# Patient Record
Sex: FEMALE | Race: WHITE | NOT HISPANIC OR LATINO | Employment: UNEMPLOYED | URBAN - METROPOLITAN AREA
[De-identification: names, ages, dates, MRNs, and addresses within clinical notes are randomized per-mention and may not be internally consistent; named-entity substitution may affect disease eponyms.]

---

## 2017-01-16 ENCOUNTER — OFFICE VISIT (OUTPATIENT)
Dept: URGENT CARE | Facility: CLINIC | Age: 47
End: 2017-01-16

## 2017-01-16 VITALS
BODY MASS INDEX: 23.47 KG/M2 | WEIGHT: 140.88 LBS | RESPIRATION RATE: 18 BRPM | OXYGEN SATURATION: 96 % | TEMPERATURE: 98 F | DIASTOLIC BLOOD PRESSURE: 70 MMHG | HEART RATE: 61 BPM | HEIGHT: 65 IN | SYSTOLIC BLOOD PRESSURE: 120 MMHG

## 2017-01-16 DIAGNOSIS — J32.9 SINUSITIS, UNSPECIFIED CHRONICITY, UNSPECIFIED LOCATION: Primary | ICD-10-CM

## 2017-01-16 PROCEDURE — 99213 OFFICE O/P EST LOW 20 MIN: CPT | Mod: ,,, | Performed by: NURSE PRACTITIONER

## 2017-01-16 PROCEDURE — 99999 PR PBB SHADOW E&M-EST. PATIENT-LVL III: CPT | Mod: PBBFAC,,, | Performed by: NURSE PRACTITIONER

## 2017-01-16 RX ORDER — AMOXICILLIN AND CLAVULANATE POTASSIUM 875; 125 MG/1; MG/1
1 TABLET, FILM COATED ORAL 2 TIMES DAILY
Qty: 20 TABLET | Refills: 0 | Status: SHIPPED | OUTPATIENT
Start: 2017-01-16 | End: 2017-01-26

## 2017-01-16 NOTE — PROGRESS NOTES
Subjective:       Patient ID: Alize Bourne is a 46 y.o. female.    Chief Complaint: Sinusitis    HPI Comments: Pt is a 46 year old female to clinic today with complaints of sinus congestion, PND, and rhinorrhea times 12 days.    Sinusitis   This is a new problem. The current episode started 1 to 4 weeks ago (12 days). The problem has been gradually worsening since onset. There has been no fever. She is experiencing no pain. Associated symptoms include congestion, coughing, headaches, sinus pressure, sneezing and a sore throat. Pertinent negatives include no chills, diaphoresis, ear pain, hoarse voice, neck pain or shortness of breath. Treatments tried: mucinex, flonase, allegra. The treatment provided mild relief.     Review of Systems   Constitutional: Negative for chills, diaphoresis, fatigue and fever.   HENT: Positive for congestion, postnasal drip, rhinorrhea, sinus pressure, sneezing and sore throat. Negative for ear discharge, ear pain and hoarse voice.    Eyes: Negative for pain.   Respiratory: Positive for cough. Negative for chest tightness, shortness of breath and wheezing.    Cardiovascular: Negative for chest pain and palpitations.   Gastrointestinal: Negative for abdominal pain, diarrhea, nausea and vomiting.   Genitourinary: Negative for dysuria.   Musculoskeletal: Negative for back pain, myalgias and neck pain.   Skin: Negative for rash.   Neurological: Positive for headaches. Negative for dizziness and light-headedness.       Objective:      Physical Exam   Constitutional: She is oriented to person, place, and time. She appears well-developed and well-nourished. No distress.   HENT:   Head: Normocephalic.   Right Ear: Tympanic membrane, external ear and ear canal normal. No tenderness. Tympanic membrane is not bulging.   Left Ear: Tympanic membrane, external ear and ear canal normal. No tenderness. Tympanic membrane is not bulging.   Nose: Mucosal edema and rhinorrhea present. Right sinus  exhibits maxillary sinus tenderness and frontal sinus tenderness. Left sinus exhibits maxillary sinus tenderness and frontal sinus tenderness.   Mouth/Throat: Uvula is midline and mucous membranes are normal. Posterior oropharyngeal erythema present. No oropharyngeal exudate or posterior oropharyngeal edema.   Eyes: Conjunctivae and EOM are normal. Pupils are equal, round, and reactive to light.   Neck: Normal range of motion. Neck supple.   Cardiovascular: Normal rate, regular rhythm, S1 normal, S2 normal and normal heart sounds.  Exam reveals no gallop and no friction rub.    No murmur heard.  Pulmonary/Chest: Effort normal and breath sounds normal. No accessory muscle usage. No apnea, no tachypnea and no bradypnea. No respiratory distress. She has no decreased breath sounds. She has no wheezes. She has no rhonchi. She has no rales.   Abdominal: Bowel sounds are normal.   Musculoskeletal: Normal range of motion.   Lymphadenopathy:        Head (right side): No submental, no submandibular and no tonsillar adenopathy present.        Head (left side): No submental, no submandibular and no tonsillar adenopathy present.     She has no cervical adenopathy.   Neurological: She is alert and oriented to person, place, and time.   Skin: Skin is warm and dry. No rash noted. She is not diaphoretic.   Psychiatric: She has a normal mood and affect. Her speech is normal and behavior is normal.   Nursing note and vitals reviewed.      Assessment:       1. Sinusitis, unspecified chronicity, unspecified location        Plan:   Sinusitis, unspecified chronicity, unspecified location  -     amoxicillin-clavulanate 875-125mg (AUGMENTIN) 875-125 mg per tablet; Take 1 tablet by mouth 2 (two) times daily.  Dispense: 20 tablet; Refill: 0      Recommend continue flonase and allegra.    · Rest and increase fluids.   · May apply warm compresses as needed.   · Saline nasal spray or saline irrigation (Neti pot) to loosen nasal  congestion.  · Flonase or Nasacort to reduce inflammation in the sinus cavities.  · Take antibiotics exactly as prescribed. Make sure to complete the entire course of antibiotics even if you start feeling better. This will prevent recurrence of your infection and bacterial resistance.   · Do not drive, drink alcohol, or take any other sedating medications or substances while taking cough syrup.   · Follow up with your primary care provider or with ENT if not improved within a few days or sooner for any new or worsening symptoms.   · Go to the ER for any fever that does not improve with Tylenol/Ibuprofen, neck stiffness, rash, severe headache, vision changes, shortness of breath, chest pain, severe facial pain or swelling, or for any other new and concerning symptoms.

## 2017-01-16 NOTE — MR AVS SNAPSHOT
[facility]  [address] [facility] [place] [address]  Phone: [phone]  Fax: [phone]                  Alize Bourne   2017 12:20 PM   Office Visit    Description:  Female : 1970   Provider:  Maged Marie NP   Department:  [facility]           Reason for Visit     Sinusitis           Diagnoses this Visit        Comments    Sinusitis, unspecified chronicity, unspecified location    -  Primary            To Do List           Goals (5 Years of Data)     None      Follow-Up and Disposition     Return if symptoms worsen or fail to improve.       These Medications        Disp Refills Start End    amoxicillin-clavulanate 875-125mg (AUGMENTIN) 875-125 mg per tablet 20 tablet 0 2017    Take 1 tablet by mouth 2 (two) times daily. - Oral    [facility] [address] [place] [address] [place] - [address] AT [facility] [address]  [address] Ph #: [phone]         [facility] On Call     [facility] On Call Nurse Care Line -  Assistance  Registered nurses in the [facility] provide clinical advisement, health education, appointment booking, and other advisory services.  Call for this free service at [phone].             Medications           Message regarding Medications     Verify the changes and/or additions to your medication regime listed below are the same as discussed with your clinician today.  If any of these changes or additions are incorrect, please notify your healthcare provider.        START taking these NEW medications        Refills    amoxicillin-clavulanate 875-125mg (AUGMENTIN) 875-125 mg per tablet 0    Sig: Take 1 tablet by mouth 2 (two) times daily.    Class: Normal    Route: Oral           Verify that the below list of medications is an accurate representation of the medications you are currently taking.  If none reported, the list may be blank. If incorrect, please contact your healthcare provider. Carry this list  "with you in case of emergency.           Current Medications     warfarin (COUMADIN) 10 MG tablet Take 10 mg by mouth every Mon, Wed, Fri.    warfarin (COUMADIN) 7.5 MG tablet Take 7.5 mg by mouth every Tues, Thurs, Sat.    amoxicillin-clavulanate 875-125mg (AUGMENTIN) 875-125 mg per tablet Take 1 tablet by mouth 2 (two) times daily.           Clinical Reference Information           Vital Signs - Last Recorded  Most recent update: 1/16/2017  1:06 PM by Pascale Azevedo LPN    BP Pulse Temp Resp    120/70 (BP Location: Right arm, Patient Position: Sitting, BP Method: Manual) 61 98.2 °F (36.8 °C) (Tympanic) 18    Ht Wt SpO2 BMI    5' 5" (1.651 m) 63.9 kg (140 lb 14 oz) 96% 23.44 kg/m2      Blood Pressure          Most Recent Value    BP  120/70      Allergies as of 1/16/2017     No Known Allergies      Immunizations Administered on Date of Encounter - 1/16/2017     None      [facility] Sign-Up     Activating your [facility] account is as easy as 1-2-3!     1) Visit [email or web address], select Sign Up Now, enter this activation code and your date of birth, then select Next.  [email or web address]  Expires: 3/2/2017  1:16 PM      2) Create a username and password to use when you visit [facility] in the future and select a security question in case you lose your password and select Next.    3) Enter your e-mail address and click Sign Up!    Additional Information  If you have questions, please e-mail [email or web address] or call [phone] to talk to our [facility] staff. Remember, [facility] is NOT to be used for urgent needs. For medical emergencies, dial 911.         Instructions      Sinusitis (Antibiotic Treatment)    The sinuses are air-filled spaces within the bones of the face. They connect to the inside of the nose. Sinusitis is an inflammation of the tissue lining the sinus cavity. Sinus inflammation can occur during a cold. It can also be due to allergies to pollens and other particles in the air. Sinusitis can cause " symptoms of sinus congestion and fullness. A sinus infection causes fever, headache and facial pain. There is often green or yellow drainage from the nose or into the back of the throat (post-nasal drip). You have been given antibiotics to treat this condition.  Home care:  · Take the full course of antibiotics as instructed. Do not stop taking them, even if you feel better.  · Drink plenty of water, hot tea, and other liquids. This may help thin mucus. It also may promote sinus drainage.  · Heat may help soothe painful areas of the face. Use a towel soaked in hot water. Or,  the shower and direct the hot spray onto your face. Using a vaporizer along with a menthol rub at night may also help.   · An expectorant containing guaifenesin may help thin the mucus and promote drainage from the sinuses.  · Over-the-counter decongestants may be used unless a similar medicine was prescribed. Nasal sprays work the fastest. Use one that contains phenylephrine or oxymetazoline. First blow the nose gently. Then use the spray. Do not use these medicines more often than directed on the label or symptoms may get worse. You may also use tablets containing pseudoephedrine. Avoid products that combine ingredients, because side effects may be increased. Read labels. You can also ask the pharmacist for help. (NOTE: Persons with high blood pressure should not use decongestants. They can raise blood pressure.)  · Over-the-counter antihistamines may help if allergies contributed to your sinusitis.    · Do not use nasal rinses or irrigation during an acute sinus infection, unless told to by your health care provider. Rinsing may spread the infection to other sinuses.  · Use acetaminophen or ibuprofen to control pain, unless another pain medicine was prescribed. (If you have chronic liver or kidney disease or ever had a stomach ulcer, talk with your doctor before using these medicines. Aspirin should never be used in anyone under 18  years of age who is ill with a fever. It may cause severe liver damage.)  · Don't smoke. This can worsen symptoms.  Follow-up care  Follow up with your healthcare provider or our staff if you are not improving within the next week.  When to seek medical advice  Call your healthcare provider if any of these occur:  · Facial pain or headache becoming more severe  · Stiff neck  · Unusual drowsiness or confusion  · Swelling of the forehead or eyelids  · Vision problems, including blurred or double vision  · Fever of 100.4ºF (38ºC) or higher, or as directed by your healthcare provider  · Seizure  · Breathing problems  · Symptoms not resolving within 10 days  © 3332-8420 [employer]. [address] [place] [address], [place] [address]. All rights reserved. This information is not intended as a substitute for professional medical care. Always follow your healthcare professional's instructions.

## 2017-10-23 ENCOUNTER — LAB VISIT (OUTPATIENT)
Dept: LAB | Facility: HOSPITAL | Age: 47
End: 2017-10-23
Attending: INTERNAL MEDICINE
Payer: COMMERCIAL

## 2017-10-23 ENCOUNTER — OFFICE VISIT (OUTPATIENT)
Dept: HEMATOLOGY/ONCOLOGY | Facility: CLINIC | Age: 47
End: 2017-10-23
Payer: COMMERCIAL

## 2017-10-23 VITALS
SYSTOLIC BLOOD PRESSURE: 128 MMHG | WEIGHT: 136.44 LBS | HEART RATE: 55 BPM | DIASTOLIC BLOOD PRESSURE: 82 MMHG | RESPIRATION RATE: 18 BRPM | OXYGEN SATURATION: 99 % | HEIGHT: 66 IN | BODY MASS INDEX: 21.93 KG/M2 | TEMPERATURE: 98 F

## 2017-10-23 DIAGNOSIS — I82.493 ACUTE DEEP VEIN THROMBOSIS (DVT) OF OTHER SPECIFIED VEIN OF BOTH LOWER EXTREMITIES: ICD-10-CM

## 2017-10-23 DIAGNOSIS — D68.51 FACTOR V LEIDEN MUTATION: ICD-10-CM

## 2017-10-23 DIAGNOSIS — D68.51 FACTOR V LEIDEN MUTATION: Primary | ICD-10-CM

## 2017-10-23 LAB
25(OH)D3+25(OH)D2 SERPL-MCNC: 47 NG/ML
ALBUMIN SERPL BCP-MCNC: 3.9 G/DL
ALP SERPL-CCNC: 35 U/L
ALT SERPL W/O P-5'-P-CCNC: 21 U/L
ANION GAP SERPL CALC-SCNC: 6 MMOL/L
AST SERPL-CCNC: 20 U/L
BASOPHILS # BLD AUTO: 0.02 K/UL
BASOPHILS NFR BLD: 0.4 %
BILIRUB SERPL-MCNC: 0.4 MG/DL
BUN SERPL-MCNC: 18 MG/DL
CALCIUM SERPL-MCNC: 8.7 MG/DL
CHLORIDE SERPL-SCNC: 104 MMOL/L
CO2 SERPL-SCNC: 26 MMOL/L
CREAT SERPL-MCNC: 0.7 MG/DL
DIFFERENTIAL METHOD: ABNORMAL
EOSINOPHIL # BLD AUTO: 0.1 K/UL
EOSINOPHIL NFR BLD: 2.4 %
ERYTHROCYTE [DISTWIDTH] IN BLOOD BY AUTOMATED COUNT: 12.8 %
EST. GFR  (AFRICAN AMERICAN): >60 ML/MIN/1.73 M^2
EST. GFR  (NON AFRICAN AMERICAN): >60 ML/MIN/1.73 M^2
GLUCOSE SERPL-MCNC: 102 MG/DL
HCT VFR BLD AUTO: 40 %
HCYS SERPL-SCNC: 5.7 UMOL/L
HGB BLD-MCNC: 13.9 G/DL
LDH SERPL L TO P-CCNC: 136 U/L
LYMPHOCYTES # BLD AUTO: 1.4 K/UL
LYMPHOCYTES NFR BLD: 26.5 %
MCH RBC QN AUTO: 35.1 PG
MCHC RBC AUTO-ENTMCNC: 34.8 G/DL
MCV RBC AUTO: 101 FL
MONOCYTES # BLD AUTO: 0.4 K/UL
MONOCYTES NFR BLD: 6.7 %
NEUTROPHILS # BLD AUTO: 3.5 K/UL
NEUTROPHILS NFR BLD: 64 %
PLATELET # BLD AUTO: 196 K/UL
PMV BLD AUTO: 11.1 FL
POTASSIUM SERPL-SCNC: 4.1 MMOL/L
PROT SERPL-MCNC: 6.4 G/DL
RBC # BLD AUTO: 3.96 M/UL
SODIUM SERPL-SCNC: 136 MMOL/L
WBC # BLD AUTO: 5.39 K/UL

## 2017-10-23 PROCEDURE — 83090 ASSAY OF HOMOCYSTEINE: CPT

## 2017-10-23 PROCEDURE — 99205 OFFICE O/P NEW HI 60 MIN: CPT | Mod: S$GLB,,, | Performed by: INTERNAL MEDICINE

## 2017-10-23 PROCEDURE — 82306 VITAMIN D 25 HYDROXY: CPT

## 2017-10-23 PROCEDURE — 80053 COMPREHEN METABOLIC PANEL: CPT | Mod: PO

## 2017-10-23 PROCEDURE — 36415 COLL VENOUS BLD VENIPUNCTURE: CPT | Mod: PO

## 2017-10-23 PROCEDURE — 83615 LACTATE (LD) (LDH) ENZYME: CPT | Mod: PO

## 2017-10-23 PROCEDURE — 85025 COMPLETE CBC W/AUTO DIFF WBC: CPT | Mod: PO

## 2017-10-23 PROCEDURE — 99999 PR PBB SHADOW E&M-EST. PATIENT-LVL III: CPT | Mod: PBBFAC,,, | Performed by: INTERNAL MEDICINE

## 2017-10-23 RX ORDER — WARFARIN SODIUM 5 MG/1
10 TABLET ORAL DAILY
Qty: 60 TABLET | Refills: 11 | Status: SHIPPED | OUTPATIENT
Start: 2017-10-23 | End: 2018-04-17 | Stop reason: SDUPTHER

## 2017-10-23 RX ORDER — AMOXICILLIN 500 MG
2 CAPSULE ORAL DAILY
COMMUNITY

## 2017-10-23 RX ORDER — CHOLECALCIFEROL (VITAMIN D3) 25 MCG
1000 TABLET ORAL DAILY
COMMUNITY

## 2017-10-23 RX ORDER — CALCIUM CARBONATE 600 MG
600 TABLET ORAL ONCE
COMMUNITY
End: 2018-11-01

## 2017-10-23 NOTE — PROGRESS NOTES
Hematology/Oncology Office Note    Reason for referral:  Recurrent DVTs  Heterozygosity for factor V Leiden mutation    CC:  Recurrent DVTs    Referred by:  Self, Aaareferral    Diagnosis:  Heterozygosity for factor V Leiden mutation  Recurrent lower limb DVTs    History of present illness:  47-year-old female with a history of heterozygosity for factor V Leiden mutation and recurrent lower extremity DVTs.  The patient carries a very strong family history of thrombosis and experience first DVT in 2005.  She was anticoagulated for approximately 2 years.  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  She was placed on indefinite anticoagulation with Coumadin at that time.  She remains on Coumadin with target INR 2-3 and manages Coumadin very well at home with home Coumadin monitor      Past Medical History:   Diagnosis Date    Deep vein thrombosis     H/O blood clots          Social History:  No tobacco, alcohol, or illicit drugs      Family History: family history is not on file.      HPI  Review of Systems   Constitutional: Negative for activity change, appetite change, fatigue, fever and unexpected weight change.   HENT: Negative for congestion, facial swelling, mouth sores, nosebleeds, rhinorrhea, sinus pain, sinus pressure, sore throat, trouble swallowing and voice change.    Eyes: Negative for pain, discharge, itching and visual disturbance.   Respiratory: Negative for apnea, cough, choking, chest tightness and shortness of breath.    Cardiovascular: Positive for leg swelling. Negative for chest pain and palpitations.   Gastrointestinal: Negative for abdominal distention, abdominal pain, diarrhea, nausea and vomiting.   Endocrine: Negative for cold intolerance and polyphagia.   Genitourinary: Negative for difficulty urinating, dyspareunia, dysuria, flank pain, frequency, hematuria, pelvic pain and vaginal bleeding.   Musculoskeletal: Negative for arthralgias, joint swelling,  "myalgias and neck pain.   Skin: Negative for pallor, rash and wound.   Allergic/Immunologic: Negative for environmental allergies and immunocompromised state.   Neurological: Negative for dizziness, seizures, syncope, speech difficulty, weakness, light-headedness and numbness.   Hematological: Negative for adenopathy. Does not bruise/bleed easily.   Psychiatric/Behavioral: Negative for agitation, behavioral problems, confusion and dysphoric mood. The patient is not nervous/anxious.        Objective:       Vitals:    10/23/17 0958   BP: 128/82   Pulse: (!) 55   Resp: 18   Temp: 97.5 °F (36.4 °C)   TempSrc: Oral   SpO2: 99%   Weight: 61.9 kg (136 lb 7.4 oz)   Height: 5' 5.5" (1.664 m)     Physical Exam   Constitutional: She is oriented to person, place, and time. She appears well-developed and well-nourished. No distress.   HENT:   Head: Normocephalic and atraumatic.   Nose: Nose normal.   Mouth/Throat: Oropharynx is clear and moist. No oropharyngeal exudate.   Eyes: Conjunctivae and EOM are normal. Pupils are equal, round, and reactive to light. No scleral icterus.   Neck: Normal range of motion. Neck supple. No tracheal deviation present. No thyromegaly present.   Cardiovascular: Normal rate, regular rhythm, normal heart sounds and intact distal pulses.  Exam reveals no gallop and no friction rub.    No murmur heard.  Pulmonary/Chest: Effort normal and breath sounds normal. She has no wheezes. She has no rales. She exhibits no tenderness.   Abdominal: Soft. Bowel sounds are normal. She exhibits no distension and no mass. There is no tenderness. There is no rebound and no guarding.   Musculoskeletal: Normal range of motion. She exhibits no edema or tenderness.   Lymphadenopathy:     She has no cervical adenopathy.   Neurological: She is alert and oriented to person, place, and time. No cranial nerve deficit. She exhibits normal muscle tone. Coordination normal.   Skin: Skin is warm and dry. Capillary refill takes " less than 2 seconds. No rash noted. She is not diaphoretic. No erythema. No pallor.   Psychiatric: She has a normal mood and affect. Her behavior is normal. Judgment and thought content normal.       Lab Results   Component Value Date    WBC 5.39 10/23/2017    HGB 13.9 10/23/2017    HCT 40.0 10/23/2017     (H) 10/23/2017     10/23/2017     No results found for: CREATININE, BUN, NA, K, CL, CO2  No results found for: ALT, AST, GGT, ALKPHOS, BILITOT      Assessment:       48 yo female with heterozygosity FVL with recurrent lower extremity DVTs currently on long-term anticoagulation with Coumadin.  Her family also has a very strong history of thrombotic events which suggest the presence of another thrombophilia risk factor.  She has managed Coumadin exceptionally well and monitors the INR at home.  We will continue Coumadin with goal INR 2-3.  We will repeat labs today which will include vitamin D level.  Plan to follow-up in 6 months with repeat labs.    Recurrent lower extremity DVTs with heterozygosity for factor V Leiden: Post-thrombotic syndrome  --Continue Coumadin with goal INR 2-3  --CBC, CMP      Vitamin D deficiency:  Currently on vitamin D/calcium supplementation  --Repeat calcium/vitamin D today

## 2018-02-08 ENCOUNTER — TELEPHONE (OUTPATIENT)
Dept: HEMATOLOGY/ONCOLOGY | Facility: CLINIC | Age: 48
End: 2018-02-08

## 2018-03-21 ENCOUNTER — TELEPHONE (OUTPATIENT)
Dept: HEMATOLOGY/ONCOLOGY | Facility: CLINIC | Age: 48
End: 2018-03-21

## 2018-03-21 NOTE — TELEPHONE ENCOUNTER
----- Message from Emilee Judge sent at 3/21/2018 11:22 AM CDT -----  Patient called and stated she is supposed to be seen in April. But she needs the orders for labs mailed to her because the doctor wants her to have her labs done before 1 week before she comes in. She never received the orders. She can be contacted at [phone].    Thanks,  Emilee

## 2018-04-17 ENCOUNTER — TELEPHONE (OUTPATIENT)
Dept: HEMATOLOGY/ONCOLOGY | Facility: CLINIC | Age: 48
End: 2018-04-17

## 2018-04-17 ENCOUNTER — OFFICE VISIT (OUTPATIENT)
Dept: HEMATOLOGY/ONCOLOGY | Facility: CLINIC | Age: 48
End: 2018-04-17
Payer: COMMERCIAL

## 2018-04-17 VITALS
RESPIRATION RATE: 18 BRPM | TEMPERATURE: 99 F | WEIGHT: 140.63 LBS | SYSTOLIC BLOOD PRESSURE: 108 MMHG | HEART RATE: 58 BPM | DIASTOLIC BLOOD PRESSURE: 74 MMHG | HEIGHT: 66 IN | OXYGEN SATURATION: 99 % | BODY MASS INDEX: 22.6 KG/M2

## 2018-04-17 DIAGNOSIS — D68.51 FACTOR V LEIDEN MUTATION: ICD-10-CM

## 2018-04-17 DIAGNOSIS — I82.493 ACUTE DEEP VEIN THROMBOSIS (DVT) OF OTHER SPECIFIED VEIN OF BOTH LOWER EXTREMITIES: ICD-10-CM

## 2018-04-17 PROCEDURE — 99999 PR PBB SHADOW E&M-EST. PATIENT-LVL III: CPT | Mod: PBBFAC,,, | Performed by: INTERNAL MEDICINE

## 2018-04-17 PROCEDURE — 99214 OFFICE O/P EST MOD 30 MIN: CPT | Mod: S$GLB,,, | Performed by: INTERNAL MEDICINE

## 2018-04-17 RX ORDER — WARFARIN SODIUM 5 MG/1
10 TABLET ORAL DAILY
Qty: 60 TABLET | Refills: 11 | Status: SHIPPED | OUTPATIENT
Start: 2018-04-17 | End: 2018-11-01 | Stop reason: SDUPTHER

## 2018-04-17 NOTE — TELEPHONE ENCOUNTER
----- Message from Bhavik Vigil Jr., MD sent at 4/17/2018  4:51 PM CDT -----  Contact: patient    Please call her and let her know that I [facility] Rx to [facility] [place].       ----- Message -----  From: Tete Ruth  Sent: 4/17/2018   3:26 PM  To: Bhavik Vigil Jr., MD    Forgot to request her refill for Warfarin 5 mg. Please call patient ASAP @ [phone]. Thanks, kristopher       [facility] [address] [place] [address] [address]  [facility] [address]   Phone: [phone] Fax: [phone]

## 2018-09-11 ENCOUNTER — PATIENT MESSAGE (OUTPATIENT)
Dept: HEMATOLOGY/ONCOLOGY | Facility: CLINIC | Age: 48
End: 2018-09-11

## 2018-10-23 ENCOUNTER — LAB VISIT (OUTPATIENT)
Dept: LAB | Facility: HOSPITAL | Age: 48
End: 2018-10-23
Attending: INTERNAL MEDICINE
Payer: COMMERCIAL

## 2018-10-23 DIAGNOSIS — D68.51 FACTOR V LEIDEN MUTATION: ICD-10-CM

## 2018-10-23 DIAGNOSIS — I82.493 ACUTE DEEP VEIN THROMBOSIS (DVT) OF OTHER SPECIFIED VEIN OF BOTH LOWER EXTREMITIES: ICD-10-CM

## 2018-10-23 LAB
25(OH)D3+25(OH)D2 SERPL-MCNC: 52 NG/ML
ALBUMIN SERPL BCP-MCNC: 4.2 G/DL
ALP SERPL-CCNC: 35 U/L
ALT SERPL W/O P-5'-P-CCNC: 14 U/L
ANION GAP SERPL CALC-SCNC: 12 MMOL/L
AST SERPL-CCNC: 20 U/L
BASOPHILS # BLD AUTO: 0.03 K/UL
BASOPHILS NFR BLD: 0.6 %
BILIRUB SERPL-MCNC: 0.3 MG/DL
BUN SERPL-MCNC: 15 MG/DL
CALCIUM SERPL-MCNC: 9.2 MG/DL
CHLORIDE SERPL-SCNC: 99 MMOL/L
CO2 SERPL-SCNC: 23 MMOL/L
CREAT SERPL-MCNC: 0.7 MG/DL
DIFFERENTIAL METHOD: ABNORMAL
EOSINOPHIL # BLD AUTO: 0.2 K/UL
EOSINOPHIL NFR BLD: 3 %
ERYTHROCYTE [DISTWIDTH] IN BLOOD BY AUTOMATED COUNT: 12.8 %
EST. GFR  (AFRICAN AMERICAN): >60 ML/MIN/1.73 M^2
EST. GFR  (NON AFRICAN AMERICAN): >60 ML/MIN/1.73 M^2
GLUCOSE SERPL-MCNC: 101 MG/DL
HCT VFR BLD AUTO: 42.8 %
HGB BLD-MCNC: 14.3 G/DL
LDH SERPL L TO P-CCNC: 117 U/L
LYMPHOCYTES # BLD AUTO: 1.7 K/UL
LYMPHOCYTES NFR BLD: 35.4 %
MCH RBC QN AUTO: 33.7 PG
MCHC RBC AUTO-ENTMCNC: 33.4 G/DL
MCV RBC AUTO: 101 FL
MONOCYTES # BLD AUTO: 0.3 K/UL
MONOCYTES NFR BLD: 6.7 %
NEUTROPHILS # BLD AUTO: 2.7 K/UL
NEUTROPHILS NFR BLD: 54.3 %
PLATELET # BLD AUTO: 248 K/UL
PMV BLD AUTO: 11 FL
POTASSIUM SERPL-SCNC: 4.1 MMOL/L
PROT SERPL-MCNC: 6.8 G/DL
RBC # BLD AUTO: 4.24 M/UL
SODIUM SERPL-SCNC: 134 MMOL/L
WBC # BLD AUTO: 4.92 K/UL

## 2018-10-23 PROCEDURE — 82306 VITAMIN D 25 HYDROXY: CPT

## 2018-10-23 PROCEDURE — 83615 LACTATE (LD) (LDH) ENZYME: CPT | Mod: PO

## 2018-10-23 PROCEDURE — 85025 COMPLETE CBC W/AUTO DIFF WBC: CPT | Mod: PO

## 2018-10-23 PROCEDURE — 36415 COLL VENOUS BLD VENIPUNCTURE: CPT | Mod: PO

## 2018-10-23 PROCEDURE — 80053 COMPREHEN METABOLIC PANEL: CPT | Mod: PO

## 2018-11-01 ENCOUNTER — OFFICE VISIT (OUTPATIENT)
Dept: HEMATOLOGY/ONCOLOGY | Facility: CLINIC | Age: 48
End: 2018-11-01
Payer: COMMERCIAL

## 2018-11-01 VITALS
OXYGEN SATURATION: 100 % | HEIGHT: 65 IN | TEMPERATURE: 97 F | SYSTOLIC BLOOD PRESSURE: 112 MMHG | HEART RATE: 55 BPM | RESPIRATION RATE: 18 BRPM | BODY MASS INDEX: 23.52 KG/M2 | WEIGHT: 141.13 LBS | DIASTOLIC BLOOD PRESSURE: 66 MMHG

## 2018-11-01 DIAGNOSIS — D68.51 FACTOR V LEIDEN MUTATION: Primary | ICD-10-CM

## 2018-11-01 DIAGNOSIS — I82.493 ACUTE DEEP VEIN THROMBOSIS (DVT) OF OTHER SPECIFIED VEIN OF BOTH LOWER EXTREMITIES: ICD-10-CM

## 2018-11-01 DIAGNOSIS — E55.9 VITAMIN D DEFICIENCY: ICD-10-CM

## 2018-11-01 PROCEDURE — 99999 PR PBB SHADOW E&M-EST. PATIENT-LVL III: CPT | Mod: PBBFAC,,, | Performed by: INTERNAL MEDICINE

## 2018-11-01 PROCEDURE — 99214 OFFICE O/P EST MOD 30 MIN: CPT | Mod: S$GLB,,, | Performed by: INTERNAL MEDICINE

## 2018-11-01 RX ORDER — WARFARIN SODIUM 5 MG/1
10 TABLET ORAL DAILY
Qty: 60 TABLET | Refills: 11 | Status: SHIPPED | OUTPATIENT
Start: 2018-11-01 | End: 2019-05-07 | Stop reason: SDUPTHER

## 2018-11-01 NOTE — PROGRESS NOTES
Hematology/Oncology Office Note    Reason for referral:  Recurrent DVTs  Heterozygosity for factor V Leiden mutation    CC:  Recurrent DVTs    Referred by:  No ref. provider found    Diagnosis:  Heterozygosity for factor V Leiden mutation  Recurrent lower limb DVTs    History of present illness:  47-year-old female with a history of heterozygosity for factor V Leiden mutation and recurrent lower extremity DVTs.  The patient carries a very strong family history of thrombosis and experience first DVT in 2005.  She was anticoagulated for approximately 2 years.  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  She was placed on indefinite anticoagulation with Coumadin at that time.  She remains on Coumadin with target INR 2-3 and manages Coumadin very well at home with home Coumadin monitor.    I have reviewed and updated the HPI, ROS, PMHx, Social Hx, Family Hx and treatment history.    Today's visit:  Patient is without complaints today.  She remains on Coumadin and denies any significant bleeding complications.    Past Medical History:   Diagnosis Date    Deep vein thrombosis     H/O blood clots          Social History:  No tobacco, alcohol, or illicit drugs      Family History: family history is not on file.      HPI    Review of Systems   Constitutional: Negative for activity change, appetite change, chills, diaphoresis and unexpected weight change.   HENT: Negative for congestion, dental problem, drooling, ear discharge, ear pain, facial swelling, mouth sores and nosebleeds.    Eyes: Negative.    Respiratory: Negative for apnea, cough, shortness of breath, wheezing and stridor.    Cardiovascular: Positive for leg swelling. Negative for chest pain and palpitations.   Gastrointestinal: Negative for abdominal distention, abdominal pain, anal bleeding, blood in stool and diarrhea.   Endocrine: Negative.    Genitourinary: Negative for difficulty urinating, dyspareunia, dysuria, enuresis,  "flank pain, frequency and hematuria.   Musculoskeletal: Negative for arthralgias, back pain, gait problem, joint swelling, myalgias and neck pain.   Skin: Negative for color change, pallor, rash and wound.   Allergic/Immunologic: Negative.    Neurological: Negative for dizziness, seizures, facial asymmetry, speech difficulty, light-headedness and headaches.   Hematological: Negative for adenopathy. Does not bruise/bleed easily.   Psychiatric/Behavioral: Negative for agitation, behavioral problems, confusion, decreased concentration, dysphoric mood and hallucinations. The patient is not nervous/anxious and is not hyperactive.        Objective:       Vitals:    11/01/18 1119   BP: 112/66   Pulse: (!) 55   Resp: 18   Temp: 97 °F (36.1 °C)   TempSrc: Oral   SpO2: 100%   Weight: 64 kg (141 lb 1.5 oz)   Height: 5' 5" (1.651 m)     Physical Exam   Constitutional: She is oriented to person, place, and time. She appears well-developed and well-nourished. No distress.   HENT:   Head: Normocephalic and atraumatic.   Nose: Nose normal.   Mouth/Throat: Oropharynx is clear and moist. No oropharyngeal exudate.   Eyes: Conjunctivae and EOM are normal. Pupils are equal, round, and reactive to light. No scleral icterus.   Neck: Normal range of motion. Neck supple. No tracheal deviation present. No thyromegaly present.   Cardiovascular: Normal rate, regular rhythm, normal heart sounds and intact distal pulses. Exam reveals no gallop and no friction rub.   No murmur heard.  Pulmonary/Chest: Effort normal and breath sounds normal. No accessory muscle usage. No respiratory distress. She has no decreased breath sounds. She has no wheezes. She has no rhonchi. She has no rales. She exhibits no tenderness.   Abdominal: Soft. Bowel sounds are normal. She exhibits no distension and no mass. There is no tenderness. There is no rebound and no guarding.   Musculoskeletal: Normal range of motion. She exhibits no edema or tenderness. "   Lymphadenopathy:        Head (right side): No submental and no submandibular adenopathy present.        Head (left side): No submental and no submandibular adenopathy present.     She has no cervical adenopathy.     She has no axillary adenopathy.        Right: No supraclavicular adenopathy present.        Left: No supraclavicular adenopathy present.   Neurological: She is alert and oriented to person, place, and time. No cranial nerve deficit. She exhibits normal muscle tone. Coordination normal.   Skin: Skin is warm and dry. Capillary refill takes less than 2 seconds. No abrasion, no bruising and no rash noted. She is not diaphoretic. No pallor.   Psychiatric: She has a normal mood and affect. Her behavior is normal. Judgment and thought content normal.       Lab Results   Component Value Date    WBC 4.92 10/23/2018    HGB 14.3 10/23/2018    HCT 42.8 10/23/2018     (H) 10/23/2018     10/23/2018     Lab Results   Component Value Date    CREATININE 0.7 10/23/2018    BUN 15 10/23/2018     (L) 10/23/2018    K 4.1 10/23/2018    CL 99 10/23/2018    CO2 23 10/23/2018     Lab Results   Component Value Date    ALT 14 10/23/2018    AST 20 10/23/2018    ALKPHOS 35 (L) 10/23/2018    BILITOT 0.3 10/23/2018         Assessment:       48 yo female with heterozygosity FVL with recurrent lower extremity DVTs currently on long-term anticoagulation with Coumadin.  Her family also has a very strong history of thrombotic events which suggest the presence of another thrombophilia risk factor.  She has managed Coumadin exceptionally well and monitors the INR at home.  We will continue Coumadin with goal INR 2-3.    Mild macrocytosis is present without associated anemia or other significant abnormalities within the CBC.  Workup has been unremarkable and CBC remains stable.  She will follow up in 6 months with repeat labs    Recurrent lower extremity DVTs with heterozygosity for factor V Leiden: Post-thrombotic  syndrome  --Continue Coumadin with goal INR 2-3  --CBC, CMP, INR checks at home with adjustment to Coumadin as necessary    Macrocytosis without anemia:  --repeat B12/folate at followup  --continue to monitor closely and proceed with bone marrow biopsy upon progression      Vitamin D deficiency:  Currently on vitamin D/calcium supplementation  --Repeat calcium/vitamin D today

## 2019-02-26 ENCOUNTER — PATIENT MESSAGE (OUTPATIENT)
Dept: HEMATOLOGY/ONCOLOGY | Facility: CLINIC | Age: 49
End: 2019-02-26

## 2019-05-07 ENCOUNTER — LAB VISIT (OUTPATIENT)
Dept: LAB | Facility: HOSPITAL | Age: 49
End: 2019-05-07
Payer: COMMERCIAL

## 2019-05-07 ENCOUNTER — OFFICE VISIT (OUTPATIENT)
Dept: HEMATOLOGY/ONCOLOGY | Facility: CLINIC | Age: 49
End: 2019-05-07
Payer: COMMERCIAL

## 2019-05-07 VITALS
WEIGHT: 143.75 LBS | BODY MASS INDEX: 23.95 KG/M2 | HEART RATE: 56 BPM | HEIGHT: 65 IN | OXYGEN SATURATION: 99 % | TEMPERATURE: 98 F | RESPIRATION RATE: 18 BRPM | DIASTOLIC BLOOD PRESSURE: 71 MMHG | SYSTOLIC BLOOD PRESSURE: 104 MMHG

## 2019-05-07 DIAGNOSIS — D68.51 FACTOR V LEIDEN MUTATION: Primary | ICD-10-CM

## 2019-05-07 DIAGNOSIS — E55.9 VITAMIN D DEFICIENCY: ICD-10-CM

## 2019-05-07 DIAGNOSIS — D68.51 FACTOR V LEIDEN MUTATION: ICD-10-CM

## 2019-05-07 DIAGNOSIS — I82.493 ACUTE DEEP VEIN THROMBOSIS (DVT) OF OTHER SPECIFIED VEIN OF BOTH LOWER EXTREMITIES: ICD-10-CM

## 2019-05-07 LAB
25(OH)D3+25(OH)D2 SERPL-MCNC: 45 NG/ML (ref 30–96)
ALBUMIN SERPL BCP-MCNC: 4.1 G/DL (ref 3.5–5.2)
ALP SERPL-CCNC: 38 U/L (ref 55–135)
ALT SERPL W/O P-5'-P-CCNC: 15 U/L (ref 10–44)
ANION GAP SERPL CALC-SCNC: 8 MMOL/L (ref 8–16)
AST SERPL-CCNC: 18 U/L (ref 10–40)
BASOPHILS # BLD AUTO: 0.03 K/UL (ref 0–0.2)
BASOPHILS NFR BLD: 0.5 % (ref 0–1.9)
BILIRUB SERPL-MCNC: 0.6 MG/DL (ref 0.1–1)
BUN SERPL-MCNC: 11 MG/DL (ref 6–20)
CALCIUM SERPL-MCNC: 9.2 MG/DL (ref 8.7–10.5)
CHLORIDE SERPL-SCNC: 101 MMOL/L (ref 95–110)
CO2 SERPL-SCNC: 25 MMOL/L (ref 23–29)
CREAT SERPL-MCNC: 0.7 MG/DL (ref 0.5–1.4)
DIFFERENTIAL METHOD: ABNORMAL
EOSINOPHIL # BLD AUTO: 0.1 K/UL (ref 0–0.5)
EOSINOPHIL NFR BLD: 1.5 % (ref 0–8)
ERYTHROCYTE [DISTWIDTH] IN BLOOD BY AUTOMATED COUNT: 12.5 % (ref 11.5–14.5)
EST. GFR  (AFRICAN AMERICAN): >60 ML/MIN/1.73 M^2
EST. GFR  (NON AFRICAN AMERICAN): >60 ML/MIN/1.73 M^2
FOLATE SERPL-MCNC: 12.9 NG/ML (ref 4–24)
GLUCOSE SERPL-MCNC: 89 MG/DL (ref 70–110)
HCT VFR BLD AUTO: 41.6 % (ref 37–48.5)
HGB BLD-MCNC: 14.4 G/DL (ref 12–16)
IMM GRANULOCYTES # BLD AUTO: 0.02 K/UL (ref 0–0.04)
IMM GRANULOCYTES NFR BLD AUTO: 0.3 % (ref 0–0.5)
IRON SERPL-MCNC: 128 UG/DL (ref 30–160)
LDH SERPL L TO P-CCNC: 134 U/L (ref 110–260)
LYMPHOCYTES # BLD AUTO: 1.4 K/UL (ref 1–4.8)
LYMPHOCYTES NFR BLD: 24.7 % (ref 18–48)
MCH RBC QN AUTO: 34.8 PG (ref 27–31)
MCHC RBC AUTO-ENTMCNC: 34.6 G/DL (ref 32–36)
MCV RBC AUTO: 101 FL (ref 82–98)
MONOCYTES # BLD AUTO: 0.5 K/UL (ref 0.3–1)
MONOCYTES NFR BLD: 8.4 % (ref 4–15)
NEUTROPHILS # BLD AUTO: 3.8 K/UL (ref 1.8–7.7)
NEUTROPHILS NFR BLD: 64.9 % (ref 38–73)
NRBC BLD-RTO: 0 /100 WBC
PLATELET # BLD AUTO: 249 K/UL (ref 150–350)
PMV BLD AUTO: 10.1 FL (ref 9.2–12.9)
POTASSIUM SERPL-SCNC: 3.6 MMOL/L (ref 3.5–5.1)
PROT SERPL-MCNC: 6.9 G/DL (ref 6–8.4)
RBC # BLD AUTO: 4.14 M/UL (ref 4–5.4)
SATURATED IRON: 35 % (ref 20–50)
SODIUM SERPL-SCNC: 134 MMOL/L (ref 136–145)
TOTAL IRON BINDING CAPACITY: 367 UG/DL (ref 250–450)
TRANSFERRIN SERPL-MCNC: 248 MG/DL (ref 200–375)
VIT B12 SERPL-MCNC: 1130 PG/ML (ref 210–950)
WBC # BLD AUTO: 5.84 K/UL (ref 3.9–12.7)

## 2019-05-07 PROCEDURE — 82607 VITAMIN B-12: CPT

## 2019-05-07 PROCEDURE — 99214 OFFICE O/P EST MOD 30 MIN: CPT | Mod: S$GLB,,, | Performed by: INTERNAL MEDICINE

## 2019-05-07 PROCEDURE — 36415 COLL VENOUS BLD VENIPUNCTURE: CPT

## 2019-05-07 PROCEDURE — 82306 VITAMIN D 25 HYDROXY: CPT

## 2019-05-07 PROCEDURE — 82746 ASSAY OF FOLIC ACID SERUM: CPT

## 2019-05-07 PROCEDURE — 83615 LACTATE (LD) (LDH) ENZYME: CPT

## 2019-05-07 PROCEDURE — 99999 PR PBB SHADOW E&M-EST. PATIENT-LVL III: ICD-10-PCS | Mod: PBBFAC,,, | Performed by: INTERNAL MEDICINE

## 2019-05-07 PROCEDURE — 85025 COMPLETE CBC W/AUTO DIFF WBC: CPT

## 2019-05-07 PROCEDURE — 99214 PR OFFICE/OUTPT VISIT, EST, LEVL IV, 30-39 MIN: ICD-10-PCS | Mod: S$GLB,,, | Performed by: INTERNAL MEDICINE

## 2019-05-07 PROCEDURE — 80053 COMPREHEN METABOLIC PANEL: CPT

## 2019-05-07 PROCEDURE — 99999 PR PBB SHADOW E&M-EST. PATIENT-LVL III: CPT | Mod: PBBFAC,,, | Performed by: INTERNAL MEDICINE

## 2019-05-07 PROCEDURE — 83540 ASSAY OF IRON: CPT

## 2019-05-07 RX ORDER — WARFARIN SODIUM 5 MG/1
10 TABLET ORAL DAILY
Qty: 60 TABLET | Refills: 11 | Status: SHIPPED | OUTPATIENT
Start: 2019-05-07 | End: 2020-05-06

## 2019-05-07 NOTE — PROGRESS NOTES
Hematology/Oncology Office Note    Reason for referral:  Recurrent DVTs  Heterozygosity for factor V Leiden mutation    CC:  Recurrent DVTs    Referred by:  No ref. provider found    Diagnosis:  Heterozygosity for factor V Leiden mutation  Recurrent lower limb DVTs    History of present illness:  47-year-old female with a history of heterozygosity for factor V Leiden mutation and recurrent lower extremity DVTs.  The patient carries a very strong family history of thrombosis and experience first DVT in 2005.  She was anticoagulated for approximately 2 years.  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  She was placed on indefinite anticoagulation with Coumadin at that time.  She remains on Coumadin with target INR 2-3 and manages Coumadin very well at home with home Coumadin monitor.    I have reviewed and updated the HPI, ROS, PMHx, Social Hx, Family Hx and treatment history.    Today's visit:  Patient is without complaints today.  She continues to be compliant with Coumadin home monitoring.    Past Medical History:   Diagnosis Date    Deep vein thrombosis     H/O blood clots          Social History:  No tobacco, alcohol, or illicit drugs      Family History: family history is not on file.      HPI    Review of Systems   Constitutional: Negative for activity change, appetite change, chills, diaphoresis and fatigue.   HENT: Negative for congestion, ear pain, facial swelling, hearing loss, mouth sores and nosebleeds.    Eyes: Negative.    Respiratory: Negative for apnea, chest tightness, shortness of breath and stridor.    Cardiovascular: Positive for leg swelling. Negative for chest pain and palpitations.   Gastrointestinal: Negative for abdominal distention, blood in stool, constipation, diarrhea, nausea and vomiting.   Endocrine: Negative.    Genitourinary: Negative for difficulty urinating, dyspareunia, enuresis, flank pain and hematuria.   Musculoskeletal: Negative for  "arthralgias, gait problem, joint swelling, myalgias and neck pain.   Skin: Negative for color change, pallor, rash and wound.   Allergic/Immunologic: Negative.    Neurological: Negative for dizziness, tremors, seizures, syncope, facial asymmetry, speech difficulty, light-headedness and numbness.   Hematological: Negative for adenopathy. Does not bruise/bleed easily.   Psychiatric/Behavioral: Negative for agitation, behavioral problems, confusion, decreased concentration, dysphoric mood and hallucinations. The patient is not nervous/anxious and is not hyperactive.        Objective:       Vitals:    05/07/19 1132   BP: 104/71   Pulse: (!) 56   Resp: 18   Temp: 98.1 °F (36.7 °C)   TempSrc: Oral   SpO2: 99%   Weight: 65.2 kg (143 lb 11.8 oz)   Height: 5' 5" (1.651 m)     Physical Exam   Constitutional: She is oriented to person, place, and time. She appears well-developed and well-nourished. No distress.   HENT:   Head: Normocephalic and atraumatic.   Nose: Nose normal.   Mouth/Throat: Oropharynx is clear and moist. No oropharyngeal exudate.   Eyes: Pupils are equal, round, and reactive to light. Conjunctivae and EOM are normal. No scleral icterus.   Neck: Normal range of motion. Neck supple. No tracheal deviation present. No thyromegaly present.   Cardiovascular: Normal rate, regular rhythm, normal heart sounds and intact distal pulses. Exam reveals no gallop and no friction rub.   No murmur heard.  Pulmonary/Chest: Effort normal and breath sounds normal. No stridor. No respiratory distress. She has no decreased breath sounds. She has no wheezes. She has no rhonchi. She has no rales. She exhibits no tenderness.   Abdominal: Soft. Bowel sounds are normal. She exhibits no distension and no mass. There is no tenderness. There is no rebound and no guarding.   Musculoskeletal: Normal range of motion. She exhibits no edema or tenderness.   Lymphadenopathy:        Head (right side): No submental and no submandibular " adenopathy present.        Head (left side): No submental and no submandibular adenopathy present.     She has no cervical adenopathy.     She has no axillary adenopathy.        Right: No supraclavicular adenopathy present.        Left: No supraclavicular adenopathy present.   Neurological: She is alert and oriented to person, place, and time. No cranial nerve deficit. She exhibits normal muscle tone. Coordination normal.   Skin: Skin is warm, dry and intact. Capillary refill takes less than 2 seconds. No abrasion, no bruising, no ecchymosis and no rash noted.   Psychiatric: She has a normal mood and affect. Her behavior is normal. Judgment and thought content normal.       Lab Results   Component Value Date    WBC 5.84 05/07/2019    HGB 14.4 05/07/2019    HCT 41.6 05/07/2019     (H) 05/07/2019     05/07/2019     Lab Results   Component Value Date    CREATININE 0.7 05/07/2019    BUN 11 05/07/2019     (L) 05/07/2019    K 3.6 05/07/2019     05/07/2019    CO2 25 05/07/2019     Lab Results   Component Value Date    ALT 15 05/07/2019    AST 18 05/07/2019    ALKPHOS 38 (L) 05/07/2019    BILITOT 0.6 05/07/2019         Assessment:       46 yo female with heterozygosity FVL with recurrent lower extremity DVTs currently on long-term anticoagulation with Coumadin.  Her family also has a very strong history of thrombotic events which suggest the presence of another thrombophilia risk factor.  She has managed Coumadin exceptionally well and monitors the INR at home.  We will continue Coumadin with goal INR 2-3.    Patient continues to have mild macrocytosis without anemia or other significant CBC abnormalities.  She will follow up with Dr. Felton in 6 months with repeat labs.     Recurrent lower extremity DVTs with heterozygosity for factor V Leiden: Post-thrombotic syndrome  --Continue Coumadin with goal INR 2-3  --CBC, CMP, INR checks at home with adjustment to Coumadin as necessary    Macrocytosis  without anemia:  --continue to monitor    Vitamin D deficiency:  Currently on vitamin D/calcium supplementation  --Repeat calcium/vitamin D today

## 2019-12-03 ENCOUNTER — PATIENT MESSAGE (OUTPATIENT)
Dept: HEMATOLOGY/ONCOLOGY | Facility: CLINIC | Age: 49
End: 2019-12-03

## 2019-12-03 ENCOUNTER — LAB VISIT (OUTPATIENT)
Dept: LAB | Facility: HOSPITAL | Age: 49
End: 2019-12-03
Attending: INTERNAL MEDICINE
Payer: COMMERCIAL

## 2019-12-03 ENCOUNTER — OFFICE VISIT (OUTPATIENT)
Dept: HEMATOLOGY/ONCOLOGY | Facility: CLINIC | Age: 49
End: 2019-12-03
Payer: COMMERCIAL

## 2019-12-03 VITALS
DIASTOLIC BLOOD PRESSURE: 74 MMHG | HEART RATE: 71 BPM | RESPIRATION RATE: 16 BRPM | HEIGHT: 65 IN | SYSTOLIC BLOOD PRESSURE: 115 MMHG | BODY MASS INDEX: 24.43 KG/M2 | TEMPERATURE: 99 F | WEIGHT: 146.63 LBS

## 2019-12-03 DIAGNOSIS — D68.51 FACTOR 5 LEIDEN MUTATION, HETEROZYGOUS: Primary | ICD-10-CM

## 2019-12-03 DIAGNOSIS — Z15.89 MTHFR MUTATION: ICD-10-CM

## 2019-12-03 DIAGNOSIS — D68.51 FACTOR V LEIDEN MUTATION: ICD-10-CM

## 2019-12-03 DIAGNOSIS — Z79.01 CURRENT USE OF LONG TERM ANTICOAGULATION: ICD-10-CM

## 2019-12-03 DIAGNOSIS — I82.493 ACUTE DEEP VEIN THROMBOSIS (DVT) OF OTHER SPECIFIED VEIN OF BOTH LOWER EXTREMITIES: ICD-10-CM

## 2019-12-03 DIAGNOSIS — Z86.718 HISTORY OF DEEP VENOUS THROMBOSIS (DVT) OF DISTAL VEIN OF RIGHT LOWER EXTREMITY: ICD-10-CM

## 2019-12-03 LAB
ALBUMIN SERPL BCP-MCNC: 4 G/DL (ref 3.5–5.2)
ALP SERPL-CCNC: 38 U/L (ref 55–135)
ALT SERPL W/O P-5'-P-CCNC: 22 U/L (ref 10–44)
ANION GAP SERPL CALC-SCNC: 8 MMOL/L (ref 8–16)
AST SERPL-CCNC: 26 U/L (ref 10–40)
BASOPHILS # BLD AUTO: 0.04 K/UL (ref 0–0.2)
BASOPHILS NFR BLD: 0.8 % (ref 0–1.9)
BILIRUB SERPL-MCNC: 0.4 MG/DL (ref 0.1–1)
BUN SERPL-MCNC: 15 MG/DL (ref 6–20)
CALCIUM SERPL-MCNC: 8.7 MG/DL (ref 8.7–10.5)
CHLORIDE SERPL-SCNC: 103 MMOL/L (ref 95–110)
CO2 SERPL-SCNC: 27 MMOL/L (ref 23–29)
CREAT SERPL-MCNC: 0.7 MG/DL (ref 0.5–1.4)
DIFFERENTIAL METHOD: ABNORMAL
EOSINOPHIL # BLD AUTO: 0.2 K/UL (ref 0–0.5)
EOSINOPHIL NFR BLD: 3.1 % (ref 0–8)
ERYTHROCYTE [DISTWIDTH] IN BLOOD BY AUTOMATED COUNT: 12.6 % (ref 11.5–14.5)
EST. GFR  (AFRICAN AMERICAN): >60 ML/MIN/1.73 M^2
EST. GFR  (NON AFRICAN AMERICAN): >60 ML/MIN/1.73 M^2
GLUCOSE SERPL-MCNC: 94 MG/DL (ref 70–110)
HCT VFR BLD AUTO: 42.2 % (ref 37–48.5)
HGB BLD-MCNC: 14.1 G/DL (ref 12–16)
IMM GRANULOCYTES # BLD AUTO: 0.01 K/UL (ref 0–0.04)
IMM GRANULOCYTES NFR BLD AUTO: 0.2 % (ref 0–0.5)
LYMPHOCYTES # BLD AUTO: 1.6 K/UL (ref 1–4.8)
LYMPHOCYTES NFR BLD: 31.5 % (ref 18–48)
MCH RBC QN AUTO: 34 PG (ref 27–31)
MCHC RBC AUTO-ENTMCNC: 33.4 G/DL (ref 32–36)
MCV RBC AUTO: 102 FL (ref 82–98)
MONOCYTES # BLD AUTO: 0.5 K/UL (ref 0.3–1)
MONOCYTES NFR BLD: 10.2 % (ref 4–15)
NEUTROPHILS # BLD AUTO: 2.8 K/UL (ref 1.8–7.7)
NEUTROPHILS NFR BLD: 54.4 % (ref 38–73)
NRBC BLD-RTO: 0 /100 WBC
PLATELET # BLD AUTO: 219 K/UL (ref 150–350)
PMV BLD AUTO: 10.1 FL (ref 9.2–12.9)
POTASSIUM SERPL-SCNC: 3.9 MMOL/L (ref 3.5–5.1)
PROT SERPL-MCNC: 6.5 G/DL (ref 6–8.4)
RBC # BLD AUTO: 4.15 M/UL (ref 4–5.4)
SODIUM SERPL-SCNC: 138 MMOL/L (ref 136–145)
WBC # BLD AUTO: 5.21 K/UL (ref 3.9–12.7)

## 2019-12-03 PROCEDURE — 99999 PR PBB SHADOW E&M-EST. PATIENT-LVL III: ICD-10-PCS | Mod: PBBFAC,,, | Performed by: INTERNAL MEDICINE

## 2019-12-03 PROCEDURE — 85025 COMPLETE CBC W/AUTO DIFF WBC: CPT

## 2019-12-03 PROCEDURE — 99999 PR PBB SHADOW E&M-EST. PATIENT-LVL III: CPT | Mod: PBBFAC,,, | Performed by: INTERNAL MEDICINE

## 2019-12-03 PROCEDURE — 99214 PR OFFICE/OUTPT VISIT, EST, LEVL IV, 30-39 MIN: ICD-10-PCS | Mod: S$GLB,,, | Performed by: INTERNAL MEDICINE

## 2019-12-03 PROCEDURE — 99214 OFFICE O/P EST MOD 30 MIN: CPT | Mod: S$GLB,,, | Performed by: INTERNAL MEDICINE

## 2019-12-03 PROCEDURE — 80053 COMPREHEN METABOLIC PANEL: CPT

## 2019-12-03 PROCEDURE — 36415 COLL VENOUS BLD VENIPUNCTURE: CPT

## 2019-12-03 RX ORDER — WARFARIN 2.5 MG/1
2.5 TABLET ORAL ONCE
Qty: 30 TABLET | Refills: 11 | Status: SHIPPED | OUTPATIENT
Start: 2019-12-03 | End: 2019-12-03

## 2019-12-03 NOTE — PROGRESS NOTES
Subjective:      DATE OF VISIT: 12/3/2019   ?   ?   Patient ID:?Alize Bourne is a 49 y.o. female.?? MR#: [id number]   ?   PRIMARY PROVIDER: Dr. Vigil  ?   CHIEF COMPLAINT:  Follow-up?????   ?   ONCOLOGIC DIAGNOSIS:  Factor 5 Leiden mutation heterozygote  ?   CURRENT TREATMENT:  Coumadin anticoagulation, INR goal 2-3      HPI    I had the pleasure meeting Ms. Bourne for the 1st time she transferred her care.  She was last seen by my colleague Dr. Vigil in May 2019.  I have reviewed her medical history which is notable for initial right lower extremity DVT in  in the setting of mild trauma and OCP use.  She notes having vascular/valvular damage from this event.  She discontinued on OCP and put on Coumadin for about a year.  In  following trip to [facility] after long plane flight developed left lower extremity edema and was later found to have superficial thrombosis for which she was again started on Coumadin.  She does note family history of brother who  from thrombotic event in .  Since her and family members have been tested and multiple family members with factor 5 Leiden; she was found to be heterozygote.  Do not have thrombophilia lab work and have requested she provide this at her convenience.  She also notes MTHFR mutation.  She has not had subsequent thrombotic event.  She continues on Coumadin with INR 2-3 and has remained within normal range with home checking and dietary regulation. DOAC option her anticoagulation has been discussed in the past although she feels most comfortable with Coumadin given availability of easy reversal agent and frequent travel to areas that may not have access to reversal if concerning bleeding complication.  She has had thus far no issues with bleeding or bruising.      Review of Systems    ?   A comprehensive 14-point review of systems was reviewed with patient and was negative other than as specified above.   ?     Objective:      Physical Exam      ?    Vitals:    12/03/19 1030   BP: 115/74   Pulse: 71   Resp: 16   Temp: 98.5 °F (36.9 °C)      ?   ECOG:?0   General appearance: Generally well appearing, in no acute distress.   Head, eyes, ears, nose, and throat: Oropharynx clear with moist mucous membranes.   Cardiovascular: Regular rate and rhythm, S1, S2, no audible murmurs.   Respiratory: Lungs clear to auscultation bilaterally.   Abdomen: nontender, nondistended.   Extremities: Warm, without edema.   Neurologic: Alert and oriented. Grossly normal strength, coordination, and gait.   Skin: No rashes, ecchymoses or petechial lesion.   Psychiatric: normal mood and affect, conversant and appropriate    ?   Laboratory:  ?   Lab Visit on 12/03/2019   Component Date Value Ref Range Status    WBC 12/03/2019 5.21  3.90 - 12.70 K/uL Final    RBC 12/03/2019 4.15  4.00 - 5.40 M/uL Final    Hemoglobin 12/03/2019 14.1  12.0 - 16.0 g/dL Final    Hematocrit 12/03/2019 42.2  37.0 - 48.5 % Final    Mean Corpuscular Volume 12/03/2019 102* 82 - 98 fL Final    Mean Corpuscular Hemoglobin 12/03/2019 34.0* 27.0 - 31.0 pg Final    Mean Corpuscular Hemoglobin Conc 12/03/2019 33.4  32.0 - 36.0 g/dL Final    RDW 12/03/2019 12.6  11.5 - 14.5 % Final    Platelets 12/03/2019 219  150 - 350 K/uL Final    MPV 12/03/2019 10.1  9.2 - 12.9 fL Final    Immature Granulocytes 12/03/2019 0.2  0.0 - 0.5 % Final    Gran # (ANC) 12/03/2019 2.8  1.8 - 7.7 K/uL Final    Immature Grans (Abs) 12/03/2019 0.01  0.00 - 0.04 K/uL Final    Lymph # 12/03/2019 1.6  1.0 - 4.8 K/uL Final    Mono # 12/03/2019 0.5  0.3 - 1.0 K/uL Final    Eos # 12/03/2019 0.2  0.0 - 0.5 K/uL Final    Baso # 12/03/2019 0.04  0.00 - 0.20 K/uL Final    nRBC 12/03/2019 0  0 /100 WBC Final    Gran% 12/03/2019 54.4  38.0 - 73.0 % Final    Lymph% 12/03/2019 31.5  18.0 - 48.0 % Final    Mono% 12/03/2019 10.2  4.0 - 15.0 % Final    Eosinophil% 12/03/2019 3.1  0.0 - 8.0 % Final    Basophil% 12/03/2019 0.8  0.0 - 1.9 %  Final    Differential Method 12/03/2019 Automated   Final    Sodium 12/03/2019 138  136 - 145 mmol/L Final    Potassium 12/03/2019 3.9  3.5 - 5.1 mmol/L Final    Chloride 12/03/2019 103  95 - 110 mmol/L Final    CO2 12/03/2019 27  23 - 29 mmol/L Final    Glucose 12/03/2019 94  70 - 110 mg/dL Final    BUN, Bld 12/03/2019 15  6 - 20 mg/dL Final    Creatinine 12/03/2019 0.7  0.5 - 1.4 mg/dL Final    Calcium 12/03/2019 8.7  8.7 - 10.5 mg/dL Final    Total Protein 12/03/2019 6.5  6.0 - 8.4 g/dL Final    Albumin 12/03/2019 4.0  3.5 - 5.2 g/dL Final    Total Bilirubin 12/03/2019 0.4  0.1 - 1.0 mg/dL Final    Alkaline Phosphatase 12/03/2019 38* 55 - 135 U/L Final    AST 12/03/2019 26  10 - 40 U/L Final    ALT 12/03/2019 22  10 - 44 U/L Final    Anion Gap 12/03/2019 8  8 - 16 mmol/L Final    eGFR if African American 12/03/2019 >60  >60 mL/min/1.73 m^2 Final    eGFR if non African American 12/03/2019 >60  >60 mL/min/1.73 m^2 Final      ?   ?   Assessment/Plan:       1. Factor 5 Leiden mutation, heterozygous    2. MTHFR mutation    3. Current use of long term anticoagulation    4. History of deep venous thrombosis (DVT) of distal vein of right lower extremity          Plan:     # factor 5 Leiden heterozygote:  History of right lower extremity DVT in setting of OCP and mild trauma to leg and subsequent superficial thrombosis in left lower extremity; also notable family history for brother with fatal thrombosis; for these reasons she has pursued lifelong anticoagulation and feels most comfortable to Coumadin given availability of reversal agent.  She does have frequent travel with upcoming trip to [facility] with her [place].  She is comfortable with self management of INR and recommended with travel and change in diet that she checks INR more frequently during her travels to adjust as needed.  I sent to pharmacy 2.5 mg tablets to lab for dose adjustment as needed.    Inherited thrombophilia testing performed at  outside facility including MTHFR and factor 5 Leiden.  Requested her to provide this at her convenience.    Follow-Up:   In 6 months    I have spent 25 minutes in this office visit in the direct face-to-face communication with the patient, with greater than 50% of the time spend in the counseling and coordination of care.   ?

## 2020-02-21 NOTE — PROGRESS NOTES
"Anesthesia Pre-Procedure Evaluation    Patient: Fay Amos   MRN:     [id number] Gender:   female   Age:    48 year old :      1971        Preoperative Diagnosis: Abnormal uterine bleeding (AUB) [N93.9]   Procedure(s):  HYSTERECTOMY, VAGINAL, LAPAROSCOPY-ASSISTED, BILATERAL SALPINGECTOMY  POSSIBLE CYSTOSCOPY  REMOVAL OF ESSURE IMPLANTS     LABS:  CBC:   Lab Results   Component Value Date    WBC 10.9 2020    HGB 13.5 2020    HCT 41.9 2020     2020     BMP: No results found for: NA, POTASSIUM, CHLORIDE, CO2, BUN, CR, GLC  COAGS: No results found for: PTT, INR, FIBR  POC: No results found for: BGM, HCG, HCGS  OTHER:   Lab Results   Component Value Date    TSH 1.54 2020        Preop Vitals    BP Readings from Last 3 Encounters:   20 (!) 138/91    Pulse Readings from Last 3 Encounters:   20 89      Resp Readings from Last 3 Encounters:   No data found for Resp    SpO2 Readings from Last 3 Encounters:   No data found for SpO2      Temp Readings from Last 1 Encounters:   No data found for Temp    Ht Readings from Last 1 Encounters:   20 1.727 m (5' 8\")      Wt Readings from Last 1 Encounters:   20 89.4 kg (197 lb)    Estimated body mass index is 29.95 kg/m  as calculated from the following:    Height as of 20: 1.727 m (5' 8\").    Weight as of 20: 89.4 kg (197 lb).     LDA:        Past Medical History:   Diagnosis Date     Hypertension       Past Surgical History:   Procedure Laterality Date     EYE SURGERY Left     Lazy eye correction      Allergies   Allergen Reactions     Erythromycin Difficulty breathing     Other reaction(s): Wheezing     Sulfa Drugs      Other reaction(s): Other (see comments)  Severe joint pain     Ciprofloxacin Hives and Rash        Anesthesia Evaluation     . Pt has had prior anesthetic.            ROS/MED HX    ENT/Pulmonary:  - neg pulmonary ROS     Neurologic:  - neg neurologic ROS     Cardiovascular:     (+) " Hematology/Oncology Office Note    Reason for referral:  Recurrent DVTs  Heterozygosity for factor V Leiden mutation    CC:  Recurrent DVTs    Referred by:  No ref. provider found    Diagnosis:  Heterozygosity for factor V Leiden mutation  Recurrent lower limb DVTs    History of present illness:  47-year-old female with a history of heterozygosity for factor V Leiden mutation and recurrent lower extremity DVTs.  The patient carries a very strong family history of thrombosis and experience first DVT in 2005.  She was anticoagulated for approximately 2 years.  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  She was placed on indefinite anticoagulation with Coumadin at that time.  She remains on Coumadin with target INR 2-3 and manages Coumadin very well at home with home Coumadin monitor.    I have reviewed and updated the HPI, ROS, PMHx, Social Hx, Family Hx and treatment history.    Today's visit:  Patient is without complaints today.  She remains compliant with Coumadin and reports no bleeding complications.      Past Medical History:   Diagnosis Date    Deep vein thrombosis     H/O blood clots          Social History:  No tobacco, alcohol, or illicit drugs      Family History: family history is not on file.      HPI    Review of Systems   Constitutional: Negative for activity change, appetite change, chills, diaphoresis and unexpected weight change.   HENT: Negative for congestion, dental problem, drooling, facial swelling, mouth sores, nosebleeds, sinus pain, sinus pressure and sore throat.    Eyes: Negative.    Respiratory: Negative for apnea, cough, choking, chest tightness and shortness of breath.    Cardiovascular: Positive for leg swelling. Negative for chest pain and palpitations.   Gastrointestinal: Negative for abdominal distention, abdominal pain, anal bleeding, blood in stool and diarrhea.   Endocrine: Negative.    Genitourinary: Negative for difficulty urinating, flank  "pain, frequency, hematuria, pelvic pain and vaginal bleeding.   Musculoskeletal: Negative for arthralgias, back pain, gait problem, joint swelling, myalgias and neck pain.   Skin: Negative for color change, pallor, rash and wound.   Allergic/Immunologic: Negative.    Neurological: Negative for dizziness, seizures, syncope, speech difficulty, weakness and light-headedness.   Hematological: Negative for adenopathy. Does not bruise/bleed easily.   Psychiatric/Behavioral: Negative for agitation, behavioral problems, confusion, decreased concentration, dysphoric mood and hallucinations. The patient is not nervous/anxious and is not hyperactive.        Objective:       Vitals:    04/17/18 1409   BP: 108/74   BP Location: Right arm   Patient Position: Sitting   BP Method: Large (Manual)   Pulse: (!) 58   Resp: 18   Temp: 98.6 °F (37 °C)   TempSrc: Oral   SpO2: 99%   Weight: 63.8 kg (140 lb 10.5 oz)   Height: 5' 5.5" (1.664 m)     Physical Exam   Constitutional: She is oriented to person, place, and time. She appears well-developed and well-nourished. No distress.   HENT:   Head: Normocephalic and atraumatic.   Nose: Nose normal.   Mouth/Throat: Oropharynx is clear and moist. No oropharyngeal exudate.   Eyes: Conjunctivae and EOM are normal. Pupils are equal, round, and reactive to light. No scleral icterus.   Neck: Normal range of motion. Neck supple. No tracheal deviation present. No thyromegaly present.   Cardiovascular: Normal rate, regular rhythm, normal heart sounds and intact distal pulses.  Exam reveals no gallop and no friction rub.    No murmur heard.  Pulmonary/Chest: Effort normal and breath sounds normal. She has no wheezes. She has no rales. She exhibits no tenderness.   Abdominal: Soft. Bowel sounds are normal. She exhibits no distension and no mass. There is no tenderness. There is no rebound and no guarding.   Musculoskeletal: Normal range of motion. She exhibits no edema or tenderness.   Lymphadenopathy: " hypertension----. : . . . :. .       METS/Exercise Tolerance:  >4 METS   Hematologic:  - neg hematologic  ROS       Musculoskeletal:  - neg musculoskeletal ROS       GI/Hepatic:  - neg GI/hepatic ROS       Renal/Genitourinary:  - ROS Renal section negative       Endo:  - neg endo ROS       Psychiatric:  - neg psychiatric ROS       Infectious Disease:  - neg infectious disease ROS       Malignancy:   (+) Malignancy History of Skin  Skin CA Remission status post Surgery,         Other:    (+) No chance of pregnancy                        PHYSICAL EXAM:   Mental Status/Neuro: A/A/O   Airway: Facies: Challenging (likely anterior )  Mallampati: I  Mouth/Opening: Full  TM distance: < 6 cm  Neck ROM: Full   Respiratory: Auscultation: CTAB     Resp. Rate: Normal     Resp. Effort: Normal      CV: Rhythm: Regular  Rate: Age appropriate  Heart: Normal Sounds  Edema: None   Comments:      Dental: Normal Dentition                Assessment:   ASA SCORE: 2    H&P: History and physical reviewed and following examination; no interval change.   Smoking Status:  Non-Smoker/Unknown   NPO Status: NPO Appropriate     Plan:   Anes. Type:  General   Pre-Medication: Acetaminophen; Gabapentin   Induction:  IV (Standard)   Airway: ETT; Oral; CMAC/VL   Access/Monitoring: PIV   Maintenance: Balanced     Postop Plan:   Postop Pain: Opioids  Postop Sedation/Airway: Not planned     PONV Management:   Adult Risk Factors: Female, Non-Smoker, Postop Opioids   Prevention: Ondansetron, Dexamethasone     CONSENT: Direct conversation   Plan and risks discussed with: Patient   Blood Products: Consented (ALL Blood Products)                   Soraya Menard MD       She has no cervical adenopathy.   Neurological: She is alert and oriented to person, place, and time. No cranial nerve deficit. She exhibits normal muscle tone. Coordination normal.   Skin: Skin is warm and dry. Capillary refill takes less than 2 seconds. No abrasion, no bruising, no ecchymosis and no rash noted. She is not diaphoretic. No cyanosis. No pallor. Nails show no clubbing.   Psychiatric: She has a normal mood and affect. Her behavior is normal. Judgment and thought content normal.       Lab Results   Component Value Date    WBC 5.39 10/23/2017    HGB 13.9 10/23/2017    HCT 40.0 10/23/2017     (H) 10/23/2017     10/23/2017     Lab Results   Component Value Date    CREATININE 0.7 10/23/2017    BUN 18 10/23/2017     10/23/2017    K 4.1 10/23/2017     10/23/2017    CO2 26 10/23/2017     Lab Results   Component Value Date    ALT 21 10/23/2017    AST 20 10/23/2017    ALKPHOS 35 (L) 10/23/2017    BILITOT 0.4 10/23/2017         Assessment:       48 yo female with heterozygosity FVL with recurrent lower extremity DVTs currently on long-term anticoagulation with Coumadin.  Her family also has a very strong history of thrombotic events which suggest the presence of another thrombophilia risk factor.  She has managed Coumadin exceptionally well and monitors the INR at home.  We will continue Coumadin with goal INR 2-3.    Mild macrocytosis is present without clear etiology.  We will continue to monitor and repeat B12/folate at follow-up in 6 months.  We discussed alternative treatment with newer factor Xa inhibitors, however, she prefers to continue with Coumadin.      Recurrent lower extremity DVTs with heterozygosity for factor V Leiden: Post-thrombotic syndrome  --Continue Coumadin with goal INR 2-3  --CBC, CMP    Macrocytosis without anemia:  --Repeat B12/folate with follow-up labs      Vitamin D deficiency:  Currently on vitamin D/calcium supplementation  --Repeat calcium/vitamin D  today